# Patient Record
Sex: FEMALE | Race: WHITE | Employment: UNEMPLOYED | URBAN - METROPOLITAN AREA
[De-identification: names, ages, dates, MRNs, and addresses within clinical notes are randomized per-mention and may not be internally consistent; named-entity substitution may affect disease eponyms.]

---

## 2018-03-15 ENCOUNTER — OFFICE VISIT (OUTPATIENT)
Dept: ORTHOPEDIC SURGERY | Age: 53
End: 2018-03-15

## 2018-03-15 VITALS — WEIGHT: 293 LBS | BODY MASS INDEX: 41.02 KG/M2 | HEIGHT: 71 IN

## 2018-03-15 DIAGNOSIS — S93.492A SPRAIN OF ANTERIOR TALOFIBULAR LIGAMENT OF LEFT ANKLE, INITIAL ENCOUNTER: ICD-10-CM

## 2018-03-15 DIAGNOSIS — S52.552D OTHER CLOSED EXTRA-ARTICULAR FRACTURE OF DISTAL END OF LEFT RADIUS WITH ROUTINE HEALING, SUBSEQUENT ENCOUNTER: Primary | ICD-10-CM

## 2018-03-15 PROCEDURE — 99024 POSTOP FOLLOW-UP VISIT: CPT | Performed by: ORTHOPAEDIC SURGERY

## 2018-03-15 NOTE — PROGRESS NOTES
mood, appropriate behavior, follows commands    NEURO: Sensation is intact distally with light touch with no alteration. Motor exam of the lower extremities show quadriceps, hamstrings, foot dorsiflexion and plantarflexion grossly intact 5/5. LYMPH: No lymphedema present distally in upper or lower extrimity. MUSCULOSKELETAL:  Gait: Patient walks around the exam room and in the hallway with a normal gait and no limp. Balance and coordination are age appropriate. Shoulder Exam:  Full range of motion without pain. Elbow Exam:  Full painless range of motion. No effusion. No tenderness to palpation. Wrist/Hand Exam:  Moderate swelling in the left wrist. Tenderness to palpation over the distal radius. Decreased range of motion secondary to pain. Tender along the snuffbox region. Imaging:  Xr Elbow Left (2 Views)    Result Date: 3/8/2018  Patient MRN:  [id number] : 1965 Age: 47 years Gender: Female Order Date:  3/8/2018 2:30 PM EXAM: XR ELBOW LEFT (2 VIEWS) NUMBER OF IMAGES:  2 INDICATION:  Pain  COMPARISON: None FINDINGS:  No joint effusion. No acute fracture, dislocation or radiopaque foreign body. 1. No acute osseous abnormality. Xr Wrist Left (min 3 Views)    Result Date: 3/8/2018  Patient MRN:  [id number] : 1965 Age: 47 years Gender: Female Order Date:  3/8/2018 2:31 PM EXAM: XR WRIST LEFT (MIN 3 VIEWS) 3 views NUMBER OF IMAGES:  3 INDICATION:  pain  COMPARISON: None FINDINGS:  Slight narrowing of the radiocarpal joint. Slight ulnar minus variance. No acute fracture, dislocation or radiopaque foreign body. 1. No acute osseous abnormality. If symptoms persist, follow-up in 5-7 days to assess for any occult injury.      Xr Foot Left (min 3 Views)    Result Date: 3/8/2018  Patient MRN:  [id number] : 1965 Age: 47 years Gender: Female Order Date:  3/8/2018 2:30 PM EXAM: XR FOOT LEFT (MIN 3 VIEWS) NUMBER OF VIEWS:  3 INDICATION:  pain status post fall COMPARISON: None

## 2018-03-23 ENCOUNTER — OFFICE VISIT (OUTPATIENT)
Dept: ORTHOPEDIC SURGERY | Age: 53
End: 2018-03-23

## 2018-03-23 VITALS — BODY MASS INDEX: 41.02 KG/M2 | WEIGHT: 293 LBS | HEIGHT: 71 IN

## 2018-03-23 DIAGNOSIS — S52.552D OTHER CLOSED EXTRA-ARTICULAR FRACTURE OF DISTAL END OF LEFT RADIUS WITH ROUTINE HEALING, SUBSEQUENT ENCOUNTER: Primary | ICD-10-CM

## 2018-03-23 PROCEDURE — 99024 POSTOP FOLLOW-UP VISIT: CPT | Performed by: ORTHOPAEDIC SURGERY

## 2018-03-25 NOTE — PROGRESS NOTES
pulses UE and LE bilateral.     PSYCHIATRY: Pleasant mood, appropriate behavior, follows commands    NEURO: Sensation is intact distally with light touch with no alteration. Motor exam of the lower extremities show quadriceps, hamstrings, foot dorsiflexion and plantarflexion grossly intact 5/5. LYMPH: No lymphedema present distally in upper or lower extrimity. MUSCULOSKELETAL:  Gait: Patient walks around the exam room and in the hallway with a normal gait and no limp. Balance and coordination are age appropriate. Shoulder Exam:  Full range of motion without pain. Elbow Exam:  Full painless range of motion. No effusion. No tenderness to palpation. Wrist/Hand Exam:  Moderate swelling in the left wrist. Tenderness to palpation over the distal radius. Decreased range of motion secondary to pain. Tender along the snuffbox region. Imaging:  Xr Elbow Left (2 Views)    Result Date: 3/8/2018  Patient MRN:  [id number] : 1965 Age: 47 years Gender: Female Order Date:  3/8/2018 2:30 PM EXAM: XR ELBOW LEFT (2 VIEWS) NUMBER OF IMAGES:  2 INDICATION:  Pain  COMPARISON: None FINDINGS:  No joint effusion. No acute fracture, dislocation or radiopaque foreign body. 1. No acute osseous abnormality. Xr Wrist Left (min 3 Views)    Result Date: 3/8/2018  Patient MRN:  [id number] : 1965 Age: 47 years Gender: Female Order Date:  3/8/2018 2:31 PM EXAM: XR WRIST LEFT (MIN 3 VIEWS) 3 views NUMBER OF IMAGES:  3 INDICATION:  pain  COMPARISON: None FINDINGS:  Slight narrowing of the radiocarpal joint. Slight ulnar minus variance. No acute fracture, dislocation or radiopaque foreign body. 1. No acute osseous abnormality. If symptoms persist, follow-up in 5-7 days to assess for any occult injury.      Xr Foot Left (min 3 Views)    Result Date: 3/8/2018  Patient MRN:  [id number] : 1965 Age: 47 years Gender: Female Order Date:  3/8/2018 2:30 PM EXAM: XR FOOT LEFT (MIN 3 VIEWS) NUMBER OF VIEWS:  3

## 2018-04-12 DIAGNOSIS — S52.552D OTHER CLOSED EXTRA-ARTICULAR FRACTURE OF DISTAL END OF LEFT RADIUS WITH ROUTINE HEALING, SUBSEQUENT ENCOUNTER: ICD-10-CM

## 2018-04-13 ENCOUNTER — OFFICE VISIT (OUTPATIENT)
Dept: ORTHOPEDIC SURGERY | Age: 53
End: 2018-04-13

## 2018-04-13 VITALS — BODY MASS INDEX: 41.02 KG/M2 | WEIGHT: 293 LBS | TEMPERATURE: 98 F | HEIGHT: 71 IN

## 2018-04-13 DIAGNOSIS — S52.552D OTHER CLOSED EXTRA-ARTICULAR FRACTURE OF DISTAL END OF LEFT RADIUS WITH ROUTINE HEALING, SUBSEQUENT ENCOUNTER: Primary | ICD-10-CM

## 2018-04-13 PROCEDURE — 99024 POSTOP FOLLOW-UP VISIT: CPT | Performed by: ORTHOPAEDIC SURGERY

## 2018-05-24 DIAGNOSIS — M25.532 LEFT WRIST PAIN: Primary | ICD-10-CM

## 2018-05-25 ENCOUNTER — OFFICE VISIT (OUTPATIENT)
Dept: ORTHOPEDIC SURGERY | Age: 53
End: 2018-05-25

## 2018-05-25 VITALS — TEMPERATURE: 97.9 F | WEIGHT: 293 LBS | BODY MASS INDEX: 41.02 KG/M2 | HEIGHT: 71 IN

## 2018-05-25 DIAGNOSIS — S52.552D OTHER CLOSED EXTRA-ARTICULAR FRACTURE OF DISTAL END OF LEFT RADIUS WITH ROUTINE HEALING, SUBSEQUENT ENCOUNTER: Primary | ICD-10-CM

## 2018-05-25 PROCEDURE — 99024 POSTOP FOLLOW-UP VISIT: CPT | Performed by: ORTHOPAEDIC SURGERY

## 2018-05-25 RX ORDER — ATORVASTATIN CALCIUM 20 MG/1
TABLET, FILM COATED ORAL
Refills: 0 | COMMUNITY
Start: 2018-05-14

## 2018-05-25 RX ORDER — AMLODIPINE BESYLATE 5 MG/1
TABLET ORAL
Refills: 0 | COMMUNITY
Start: 2018-05-14

## 2018-07-28 ENCOUNTER — HOSPITAL ENCOUNTER (EMERGENCY)
Age: 53
Discharge: HOME | End: 2018-07-28
Payer: COMMERCIAL

## 2018-07-28 VITALS
OXYGEN SATURATION: 96 % | TEMPERATURE: 97.7 F | HEART RATE: 67 BPM | RESPIRATION RATE: 18 BRPM | WEIGHT: 293 LBS | HEIGHT: 71 IN | DIASTOLIC BLOOD PRESSURE: 83 MMHG | SYSTOLIC BLOOD PRESSURE: 131 MMHG | BODY MASS INDEX: 41.02 KG/M2

## 2018-07-28 DIAGNOSIS — N39.0 URINARY TRACT INFECTION WITHOUT HEMATURIA, SITE UNSPECIFIED: Primary | ICD-10-CM

## 2018-07-28 LAB
BACTERIA: ABNORMAL /HPF
BILIRUBIN URINE: NEGATIVE
BLOOD, URINE: ABNORMAL
CLARITY: CLEAR
COLOR: YELLOW
EPITHELIAL CELLS, UA: ABNORMAL /HPF
GLUCOSE URINE: NEGATIVE MG/DL
KETONES, URINE: NEGATIVE MG/DL
LEUKOCYTE ESTERASE, URINE: ABNORMAL
NITRITE, URINE: NEGATIVE
PH UA: 6 (ref 5–9)
PROTEIN UA: NEGATIVE MG/DL
RBC UA: ABNORMAL /HPF (ref 0–2)
SPECIFIC GRAVITY UA: <=1.005 (ref 1–1.03)
UROBILINOGEN, URINE: 0.2 E.U./DL
WBC UA: >20 /HPF (ref 0–5)

## 2018-07-28 PROCEDURE — 87088 URINE BACTERIA CULTURE: CPT

## 2018-07-28 PROCEDURE — 99212 OFFICE O/P EST SF 10 MIN: CPT

## 2018-07-28 PROCEDURE — 81001 URINALYSIS AUTO W/SCOPE: CPT

## 2018-07-28 RX ORDER — PHENAZOPYRIDINE HYDROCHLORIDE 100 MG/1
100 TABLET, FILM COATED ORAL 3 TIMES DAILY PRN
Qty: 6 TABLET | Refills: 0 | Status: SHIPPED | OUTPATIENT
Start: 2018-07-28 | End: 2018-07-30

## 2018-07-28 RX ORDER — SOLIFENACIN SUCCINATE 10 MG/1
10 TABLET, FILM COATED ORAL DAILY
COMMUNITY
End: 2020-06-24

## 2018-07-28 RX ORDER — CEPHALEXIN 500 MG/1
500 CAPSULE ORAL 3 TIMES DAILY
Qty: 21 CAPSULE | Refills: 0 | Status: SHIPPED | OUTPATIENT
Start: 2018-07-28 | End: 2018-08-04

## 2018-07-28 RX ORDER — NEBIVOLOL 10 MG/1
TABLET ORAL DAILY
COMMUNITY
End: 2020-06-24

## 2018-07-28 ASSESSMENT — PAIN DESCRIPTION - PAIN TYPE: TYPE: ACUTE PAIN

## 2018-07-28 ASSESSMENT — PAIN DESCRIPTION - LOCATION: LOCATION: ABDOMEN;BACK;PERINEUM

## 2018-07-28 ASSESSMENT — PAIN DESCRIPTION - ONSET: ONSET: GRADUAL

## 2018-07-28 ASSESSMENT — PAIN SCALES - GENERAL: PAINLEVEL_OUTOF10: 8

## 2018-07-28 ASSESSMENT — PAIN DESCRIPTION - PROGRESSION: CLINICAL_PROGRESSION: GRADUALLY WORSENING

## 2018-07-28 ASSESSMENT — PAIN DESCRIPTION - ORIENTATION: ORIENTATION: LOWER

## 2018-07-28 ASSESSMENT — PAIN DESCRIPTION - FREQUENCY: FREQUENCY: CONTINUOUS

## 2018-07-28 ASSESSMENT — PAIN DESCRIPTION - DESCRIPTORS: DESCRIPTORS: PRESSURE;BURNING

## 2018-07-28 NOTE — ED PROVIDER NOTES
Department of Emergency Medicine  ED Provider Note  Admit Date: 7/28/2018  Room: 02/02 7/28/18  8:45 AM      HISTORY OF PRESENT ILLNESS:  (Nurses Notes Reviewed)    Chief Complaint:   Abdominal Pain (Having lower abdominal and lower back pain and pressure); Dysuria (Burning with urination.); and Chills          Anali Contreras is a 47 y.o. old female presenting to the emergency department for concerns for urinary tract infection, which occurred 5 hours prior to arrival.  It woke her up around 3:00 this morning and she's been up ever since with symptoms. Since onset the symptoms have been constant and mild-moderate in severity. Symptoms are associated with dysuria, urinary frequency and urinary urgency and denies any abdominal pain, back pain or vaginal discharge. Review of Systems:   Pertinent positives and negatives are stated within HPI, all other systems reviewed and are negative.          --------------------------------------------- PAST HISTORY ---------------------------------------------  Past Medical History:  has a past medical history of Asymptomatic varicose veins; Breast self examination education, encounter for; Chronic cystitis; Colon polyps; Depression screening; Diabetes mellitus screening; Diuretic-induced hypokalemia; GERD (gastroesophageal reflux disease); Hematuria; Hx of mammogram; Hyperlipidemia; Hypertension; Immunizations up to date; Laboratory test; Migraines; Osteoarthritis; Pap smear for cervical cancer screening; Raynaud's phenomenon; Scleroderma ([facility] [address] Unspecified hemorrhoids without mention of complication; Unspecified vitamin D deficiency; Urinary tract infection, site not specified; and Varicose veins. Past Surgical History:  has a past surgical history that includes Cholecystectomy (12/19/1994 approx.) and Colonoscopy (01/03/11). Social History:  reports that she has never smoked.  She has never used smokeless tobacco. She reports that she does not drink

## 2018-07-30 LAB — URINE CULTURE, ROUTINE: NORMAL

## 2020-02-03 ENCOUNTER — HOSPITAL ENCOUNTER (OUTPATIENT)
Age: 55
Discharge: HOME | End: 2020-02-05
Payer: COMMERCIAL

## 2020-02-03 LAB
ALBUMIN SERPL-MCNC: 4.3 G/DL (ref 3.5–5.2)
ALP BLD-CCNC: 97 U/L (ref 35–104)
ALT SERPL-CCNC: 16 U/L (ref 0–32)
ANION GAP SERPL CALCULATED.3IONS-SCNC: 16 MMOL/L (ref 7–16)
AST SERPL-CCNC: 14 U/L (ref 0–31)
BILIRUB SERPL-MCNC: 0.5 MG/DL (ref 0–1.2)
BUN BLDV-MCNC: 16 MG/DL (ref 6–20)
CALCIUM SERPL-MCNC: 9.5 MG/DL (ref 8.6–10.2)
CHLORIDE BLD-SCNC: 102 MMOL/L (ref 98–107)
CHOLESTEROL, TOTAL: 154 MG/DL (ref 0–199)
CO2: 23 MMOL/L (ref 22–29)
CREAT SERPL-MCNC: 0.9 MG/DL (ref 0.5–1)
GFR AFRICAN AMERICAN: >60
GFR NON-AFRICAN AMERICAN: >60 ML/MIN/1.73
GLUCOSE BLD-MCNC: 91 MG/DL (ref 74–99)
HCT VFR BLD CALC: 48 % (ref 34–48)
HDLC SERPL-MCNC: 36 MG/DL
HEMOGLOBIN: 15 G/DL (ref 11.5–15.5)
LDL CHOLESTEROL CALCULATED: 88 MG/DL (ref 0–99)
MCH RBC QN AUTO: 28.3 PG (ref 26–35)
MCHC RBC AUTO-ENTMCNC: 31.3 % (ref 32–34.5)
MCV RBC AUTO: 90.6 FL (ref 80–99.9)
PDW BLD-RTO: 12.9 FL (ref 11.5–15)
PLATELET # BLD: 193 E9/L (ref 130–450)
PMV BLD AUTO: 12 FL (ref 7–12)
POTASSIUM SERPL-SCNC: 4.2 MMOL/L (ref 3.5–5)
RBC # BLD: 5.3 E12/L (ref 3.5–5.5)
SODIUM BLD-SCNC: 141 MMOL/L (ref 132–146)
TOTAL PROTEIN: 7.8 G/DL (ref 6.4–8.3)
TRIGL SERPL-MCNC: 151 MG/DL (ref 0–149)
VLDLC SERPL CALC-MCNC: 30 MG/DL
WBC # BLD: 6.2 E9/L (ref 4.5–11.5)

## 2020-02-03 PROCEDURE — 85027 COMPLETE CBC AUTOMATED: CPT

## 2020-02-03 PROCEDURE — 80053 COMPREHEN METABOLIC PANEL: CPT

## 2020-02-03 PROCEDURE — 80061 LIPID PANEL: CPT

## 2020-06-24 ENCOUNTER — OFFICE VISIT (OUTPATIENT)
Dept: ORTHOPEDIC SURGERY | Age: 55
End: 2020-06-24
Payer: COMMERCIAL

## 2020-06-24 VITALS — BODY MASS INDEX: 41.02 KG/M2 | HEIGHT: 71 IN | WEIGHT: 293 LBS

## 2020-06-24 PROCEDURE — 99213 OFFICE O/P EST LOW 20 MIN: CPT | Performed by: ORTHOPAEDIC SURGERY

## 2020-06-24 RX ORDER — LOSARTAN POTASSIUM 50 MG/1
TABLET ORAL
COMMUNITY
Start: 2020-05-21

## 2020-06-24 NOTE — PROGRESS NOTES
Chief Complaint   Patient presents with    Knee Pain     Right knee pain for about a month. Patient started a new walking regimen. about 2 weeks ago while going up steps she felt a pull in the posterior/hamsting region. It has improved to a degree. HPI:    Patient is 48 y.o. female complaining chronic, atraumatic, insidious onset right knee pain for 4 weeks. She admits to stiffness, deep, aching pain, swelling, difficulty with stairs and ambulating far distances. She admits to gross instability specifically in her right knee. Previous treatments include rest, walking, stretching, anti-inflammatory medications and ice without much relief. ROS:    Skin: (-) rash,(-) psoriasis,(-) eczema, (-)skin cancer. Neurologic: (-)numbness, (-)tingling, (-)headaches, (-) LOC. Cardiovascular: (-) Chest pain, (-) swelling in legs/feet, (-) SOB, (-) cramping in legs/feet with walking. All other review of systems negative except stated above or in HPI      Past Medical History:   Diagnosis Date    Asymptomatic varicose veins     Breast self examination education, encounter for     Chronic cystitis     found on cystoscopy    Colon polyps     Depression screening     Diabetes mellitus screening 02/12/14    HGB A1c    Diuretic-induced hypokalemia 1/26/2016    GERD (gastroesophageal reflux disease)     Hematuria     evaluated 2013 by Dr. Elvia Chatman. Cystoscopy revealed chronic cystitis.     Hx of mammogram     05/01/13    Hyperlipidemia 02/12/14    Hypertension     Immunizations up to date 09/21/14    INFLUENZA    Laboratory test 07/15/15    T3 FREE, TSH, T4 FREE    Migraines     Osteoarthritis     Pap smear for cervical cancer screening     Raynaud's phenomenon     Scleroderma ([facility] [address]     Unspecified hemorrhoids without mention of complication     Unspecified vitamin D deficiency     Urinary tract infection, site not specified     Varicose veins      Past Surgical History:   Procedure Laterality Not on file     Family History   Problem Relation Age of Onset    Anxiety Disorder Other         Nervous breakdown    Hypertension Other     Cancer Other         Breast    Cancer Mother         Lung    Osteoarthritis Mother     Alcohol Abuse Mother     Anxiety Disorder Father         Nervous breakdown    Cancer Father         Colon    Diabetes Maternal Aunt     Cancer Maternal Grandmother         Uterus    Coronary Art Dis Paternal Grandmother     Coronary Art Dis Paternal Grandfather            Physical Exam:    Ht 5' 11\" (1.803 m)   Wt (!) 337 lb (152.9 kg)   LMP 05/08/2015 (Approximate)   BMI 47.00 kg/m²     GENERAL: alert, appears stated age, cooperative, no acute distress    HEENT: Head is normocephalic, atraumatic. PERRLA. SKIN: Clean, dry, intact. There is not any cellulitis or cutaneous lesions noted in the lower extremities except noted in MSK    PULMONARY: breathing is regular and unlabored, no acute distress    CV: The bilateral upper and lower extremities are warm and well-perfused with brisk capillary refill. 2+ pulses UE and LE bilateral.     PSYCHIATRY: Pleasant mood, appropriate behavior, follows commands    NEURO: Sensation is intact distally with light touch with no alteration. Motor exam of the lower extremities show quadriceps, hamstrings, foot dorsiflexion and plantarflexion grossly intact 5/5. LYMPH: No lymphedema present distally in upper or lower extremity. MUSCULOSKELETAL:    Right Knee Exam:    mild effusion noted. No erythema/induration/fluctuance. Posterior medial joint line TTP. Stable to varus and valgus at 0 and 30 degrees of flexion. Negative Lachman's and posterior drawer. Negative patellar grind test and J sign. Compartments soft and compressible throughout leg. Active range of motion 0-120 with pain.   Positive Ish's positive Apley's, gait is antalgic        Imaging:  Xr Knee Right (min 4 Views)    Result Date: 6/24/2020  EXAMINATION: FOUR XRAY

## 2020-07-08 ENCOUNTER — OFFICE VISIT (OUTPATIENT)
Dept: ORTHOPEDIC SURGERY | Age: 55
End: 2020-07-08
Payer: COMMERCIAL

## 2020-07-08 VITALS — BODY MASS INDEX: 41.02 KG/M2 | HEIGHT: 71 IN | WEIGHT: 293 LBS

## 2020-07-08 PROCEDURE — 99214 OFFICE O/P EST MOD 30 MIN: CPT | Performed by: ORTHOPAEDIC SURGERY

## 2020-07-08 NOTE — PROGRESS NOTES
Chief Complaint   Patient presents with    Knee Pain     Right knee MRI follow up. HPI:    Patient is 48 y.o. female complaining chronic, atraumatic, insidious onset right knee pain for 4 weeks. She admits to stiffness, deep, aching pain, swelling, difficulty with stairs and ambulating far distances. She admits to gross instability specifically in her right knee. Previous treatments include rest, walking, stretching, anti-inflammatory medications and ice without much relief. Follows up after MRI. ROS:    Skin: (-) rash,(-) psoriasis,(-) eczema, (-)skin cancer. Neurologic: (-)numbness, (-)tingling, (-)headaches, (-) LOC. Cardiovascular: (-) Chest pain, (-) swelling in legs/feet, (-) SOB, (-) cramping in legs/feet with walking. All other review of systems negative except stated above or in HPI      Past Medical History:   Diagnosis Date    Asymptomatic varicose veins     Breast self examination education, encounter for     Chronic cystitis     found on cystoscopy    Colon polyps     Depression screening     Diabetes mellitus screening 02/12/14    HGB A1c    Diuretic-induced hypokalemia 1/26/2016    GERD (gastroesophageal reflux disease)     Hematuria     evaluated 2013 by Dr. Benítez. Cystoscopy revealed chronic cystitis.  Hx of mammogram     05/01/13    Hyperlipidemia 02/12/14    Hypertension     Immunizations up to date 09/21/14    INFLUENZA    Laboratory test 07/15/15    T3 FREE, TSH, T4 FREE    Migraines     Osteoarthritis     Pap smear for cervical cancer screening     Raynaud's phenomenon     Scleroderma ([facility] [address]     Unspecified hemorrhoids without mention of complication     Unspecified vitamin D deficiency     Urinary tract infection, site not specified     Varicose veins      Past Surgical History:   Procedure Laterality Date    CHOLECYSTECTOMY  12/19/1994 approx.     COLONOSCOPY  01/03/11    Dr. Aron Taveras       Current Outpatient Medications:     losartan (COZAAR) 50 MG tablet, take 1 tablet by mouth once daily, Disp: , Rfl:     amLODIPine (NORVASC) 5 MG tablet, take 1 tablet by mouth once daily as directed, Disp: , Rfl: 0    atorvastatin (LIPITOR) 20 MG tablet, take 1 tablet by mouth once daily, Disp: , Rfl: 0    aspirin 81 MG tablet, Take 81 mg by mouth daily, Disp: , Rfl:     Cholecalciferol (VITAMIN D3) 2000 UNITS CAPS, Take 2,000 Units by mouth daily, Disp: , Rfl:   Allergies   Allergen Reactions    Lisinopril      Social History     Socioeconomic History    Marital status:      Spouse name: Not on file    Number of children: Not on file    Years of education: Not on file    Highest education level: Not on file   Occupational History    Not on file   Social Needs    Financial resource strain: Not on file    Food insecurity     Worry: Not on file     Inability: Not on file    Transportation needs     Medical: Not on file     Non-medical: Not on file   Tobacco Use    Smoking status: Never Smoker    Smokeless tobacco: Never Used   Substance and Sexual Activity    Alcohol use: No    Drug use: No    Sexual activity: Yes     Partners: Male   Lifestyle    Physical activity     Days per week: Not on file     Minutes per session: Not on file    Stress: Not on file   Relationships    Social connections     Talks on phone: Not on file     Gets together: Not on file     Attends Denominational service: Not on file     Active member of club or organization: Not on file     Attends meetings of clubs or organizations: Not on file     Relationship status: Not on file    Intimate partner violence     Fear of current or ex partner: Not on file     Emotionally abused: Not on file     Physically abused: Not on file     Forced sexual activity: Not on file   Other Topics Concern    Not on file   Social History Narrative    Not on file     Family History   Problem Relation Age of Onset    Anxiety Disorder Other         Nervous breakdown    Hypertension Other     Cancer Other         Breast    Cancer Mother         Lung    Osteoarthritis Mother     Alcohol Abuse Mother     Anxiety Disorder Father         Nervous breakdown    Cancer Father         Colon    Diabetes Maternal Aunt     Cancer Maternal Grandmother         Uterus    Coronary Art Dis Paternal Grandmother     Coronary Art Dis Paternal Grandfather            Physical Exam:    Ht 5' 11\" (1.803 m)   Wt (!) 337 lb (152.9 kg)   LMP 05/08/2015 (Approximate)   BMI 47.00 kg/m²     GENERAL: alert, appears stated age, cooperative, no acute distress    HEENT: Head is normocephalic, atraumatic. PERRLA. SKIN: Clean, dry, intact. There is not any cellulitis or cutaneous lesions noted in the lower extremities except noted in MSK    PULMONARY: breathing is regular and unlabored, no acute distress    CV: The bilateral upper and lower extremities are warm and well-perfused with brisk capillary refill. 2+ pulses UE and LE bilateral.     PSYCHIATRY: Pleasant mood, appropriate behavior, follows commands    NEURO: Sensation is intact distally with light touch with no alteration. Motor exam of the lower extremities show quadriceps, hamstrings, foot dorsiflexion and plantarflexion grossly intact 5/5. LYMPH: No lymphedema present distally in upper or lower extremity. MUSCULOSKELETAL:    Right Knee Exam:    mild effusion noted. No erythema/induration/fluctuance. Posterior medial joint line TTP. Stable to varus and valgus at 0 and 30 degrees of flexion. Negative Lachman's and posterior drawer. Negative patellar grind test and J sign. Compartments soft and compressible throughout leg. Active range of motion 0-120 with pain. Positive Ish's positive Apley's, gait is antalgic        Imaging:  Xr Knee Right (min 4 Views)    Result Date: 6/24/2020  EXAMINATION: FOUR XRAY VIEWS OF THE RIGHT KNEE 6/24/2020 8:23 am COMPARISON: None.  HISTORY: ORDERING SYSTEM PROVIDED HISTORY: Right knee pain, unspecified chronicity FINDINGS: Right knee: There is moderate narrowing of the medial joint space. There is no evidence for osteophyte formation. The lateral joint space appears unremarkable. There is mild narrowing of the patellofemoral joint space. There is no fracture, effusion or dislocation. There is no joint effusion. Limited evaluation of the left knee demonstrates some minimal medial joint space narrowing. Moderate narrowing of the medial joint space and patellofemoral joint space with no evidence for osteophyte formation. Mri Knee Right Wo Contrast    Result Date: 7/6/2020  EXAMINATION: MRI OF THE RIGHT KNEE WITHOUT CONTRAST, 7/2/2020 6:07 am TECHNIQUE: Multiplanar multisequence MRI of the right knee was performed without the administration of intravenous contrast. COMPARISON: None. HISTORY: ORDERING SYSTEM PROVIDED HISTORY: Tear of meniscus of right knee, unspecified meniscus, unspecified tear type, unspecified whether old or current tear FINDINGS: MENISCI: There is a subtle nondisplaced oblique tear of the posterior horn and body of the medial meniscus extending to the undersurface of the meniscus. No parameniscal cyst.  The lateral meniscus is intact. CRUCIATE LIGAMENTS: The anterior and posterior cruciate ligaments are normal in signal, morphology and course. EXTENSOR MECHANISM: The quadriceps and patellar tendons are intact. The medial and lateral patellar retinacula are intact. LATERAL COLLATERAL LIGAMENT COMPLEX: The popliteus tendon, biceps femoris tendon, fibular collateral ligament and iliotibial band are intact. MEDIAL COLLATERAL LIGAMENT COMPLEX: The superficial and deep components of the medial collateral ligament are intact. KNEE JOINT: No significant joint effusion. No Baker's cyst.  Grade 2/3 chondromalacia changes noted along the patellar apex and lateral patellar facet with articular cartilage thinning and small subchondral cysts noted along the lateral patellar facet.   There is mild lateral tilt of the patella. Mild thinning of the articular cartilage in the medial compartment of the knee with small marginal osteophytes. No focal articular cartilage defect. No evidence of osteochondral abnormality. BONE MARROW: No acute fracture or marrow occupying lesion. No acute marrow contusion. Nondisplaced oblique tear of the posterior horn and body of the medial meniscus with features suggesting a degenerative type tear. No evidence of parameniscal cyst.  Mild associated medial compartmental articular cartilage degenerative changes without focal articular cartilage defect. Grade 2/3 chondromalacia changes of the patellar apex and lateral patellar facet with mild lateral patellar tilt. Dickson was seen today for knee pain. Diagnoses and all orders for this visit:    Tear of meniscus of right knee, unspecified meniscus, unspecified tear type, unspecified whether old or current tear    Primary osteoarthritis of right knee    BMI 45.0-49.9, [facility])        Patient seen and examined. X-rays reviewed. Patient has little to no arthritis noted on x-rays today. However patient's main complaint is instability of symptomatic knee. Exam and history is consistent with possible meniscus tear. MRI recommended for further evaluation management. Patient seen and examined. MRI reviewed with patient in detail. Natural history and course discussed with patient in long discussion  Treatment options discussed with patient in detail including risks and benefits. Patient should do well with conservative management as patient would like to avoid surgery at this time. In a 15 minute assessment and discussion, patient was counseled on continued weight loss, diet, and physical activity relating to this condition.  She was educated with options in detail including nutrition, joining a health club/ weight loss program, and use of cardio equipment such as the Arc Trainer and the importance of use as well

## 2020-09-16 ENCOUNTER — OFFICE VISIT (OUTPATIENT)
Dept: ORTHOPEDIC SURGERY | Age: 55
End: 2020-09-16
Payer: COMMERCIAL

## 2020-09-16 VITALS — BODY MASS INDEX: 41.02 KG/M2 | WEIGHT: 293 LBS | HEIGHT: 71 IN

## 2020-09-16 PROCEDURE — 99214 OFFICE O/P EST MOD 30 MIN: CPT | Performed by: ORTHOPAEDIC SURGERY

## 2020-09-16 NOTE — PROGRESS NOTES
Chief Complaint   Patient presents with    Knee Pain     c/o right knee pain off and on         HPI:    Patient is 48 y.o. female complaining chronic, atraumatic, insidious onset right knee pain for 4 weeks. She admits to stiffness, deep, aching pain, swelling, difficulty with stairs and ambulating far distances. She admits to gross instability specifically in her right knee. Previous treatments include rest, walking, stretching, anti-inflammatory medications and ice without much relief. Follows up after MRI and recheck. ROS:    Skin: (-) rash,(-) psoriasis,(-) eczema, (-)skin cancer. Neurologic: (-)numbness, (-)tingling, (-)headaches, (-) LOC. Cardiovascular: (-) Chest pain, (-) swelling in legs/feet, (-) SOB, (-) cramping in legs/feet with walking. All other review of systems negative except stated above or in HPI      Past Medical History:   Diagnosis Date    Asymptomatic varicose veins     Breast self examination education, encounter for     Chronic cystitis     found on cystoscopy    Colon polyps     Depression screening     Diabetes mellitus screening 02/12/14    HGB A1c    Diuretic-induced hypokalemia 1/26/2016    GERD (gastroesophageal reflux disease)     Hematuria     evaluated 2013 by Dr. Benítez. Cystoscopy revealed chronic cystitis.  Hx of mammogram     05/01/13    Hyperlipidemia 02/12/14    Hypertension     Immunizations up to date 09/21/14    INFLUENZA    Laboratory test 07/15/15    T3 FREE, TSH, T4 FREE    Migraines     Osteoarthritis     Pap smear for cervical cancer screening     Raynaud's phenomenon     Scleroderma ([facility] [address]     Unspecified hemorrhoids without mention of complication     Unspecified vitamin D deficiency     Urinary tract infection, site not specified     Varicose veins      Past Surgical History:   Procedure Laterality Date    CHOLECYSTECTOMY  12/19/1994 approx.     COLONOSCOPY  01/03/11    Dr. Aron Taveras       Current Outpatient Medications:    Hypertension Other     Cancer Other         Breast    Cancer Mother         Lung    Osteoarthritis Mother     Alcohol Abuse Mother     Anxiety Disorder Father         Nervous breakdown    Cancer Father         Colon    Diabetes Maternal Aunt     Cancer Maternal Grandmother         Uterus    Coronary Art Dis Paternal Grandmother     Coronary Art Dis Paternal Grandfather            Physical Exam:    Ht 5' 11\" (1.803 m)   Wt (!) 322 lb (146.1 kg)   LMP 05/08/2015 (Approximate)   BMI 44.91 kg/m²     GENERAL: alert, appears stated age, cooperative, no acute distress    HEENT: Head is normocephalic, atraumatic. PERRLA. SKIN: Clean, dry, intact. There is not any cellulitis or cutaneous lesions noted in the lower extremities except noted in MSK    PULMONARY: breathing is regular and unlabored, no acute distress    CV: The bilateral upper and lower extremities are warm and well-perfused with brisk capillary refill. 2+ pulses UE and LE bilateral.     PSYCHIATRY: Pleasant mood, appropriate behavior, follows commands    NEURO: Sensation is intact distally with light touch with no alteration. Motor exam of the lower extremities show quadriceps, hamstrings, foot dorsiflexion and plantarflexion grossly intact 5/5. LYMPH: No lymphedema present distally in upper or lower extremity. MUSCULOSKELETAL:    Right Knee Exam:    mild effusion noted. No erythema/induration/fluctuance. Posterior medial joint line TTP. Stable to varus and valgus at 0 and 30 degrees of flexion. Negative Lachman's and posterior drawer. Negative patellar grind test and J sign. Compartments soft and compressible throughout leg. Active range of motion 0-120 with pain. Positive Ish's positive Apley's, gait is antalgic        Imaging:  Xr Knee Right (min 4 Views)    Result Date: 6/24/2020  EXAMINATION: FOUR XRAY VIEWS OF THE RIGHT KNEE 6/24/2020 8:23 am COMPARISON: None.  HISTORY: ORDERING SYSTEM PROVIDED HISTORY: Right knee pain, unspecified chronicity FINDINGS: Right knee: There is moderate narrowing of the medial joint space. There is no evidence for osteophyte formation. The lateral joint space appears unremarkable. There is mild narrowing of the patellofemoral joint space. There is no fracture, effusion or dislocation. There is no joint effusion. Limited evaluation of the left knee demonstrates some minimal medial joint space narrowing. Moderate narrowing of the medial joint space and patellofemoral joint space with no evidence for osteophyte formation. Mri Knee Right Wo Contrast    Result Date: 7/6/2020  EXAMINATION: MRI OF THE RIGHT KNEE WITHOUT CONTRAST, 7/2/2020 6:07 am TECHNIQUE: Multiplanar multisequence MRI of the right knee was performed without the administration of intravenous contrast. COMPARISON: None. HISTORY: ORDERING SYSTEM PROVIDED HISTORY: Tear of meniscus of right knee, unspecified meniscus, unspecified tear type, unspecified whether old or current tear FINDINGS: MENISCI: There is a subtle nondisplaced oblique tear of the posterior horn and body of the medial meniscus extending to the undersurface of the meniscus. No parameniscal cyst.  The lateral meniscus is intact. CRUCIATE LIGAMENTS: The anterior and posterior cruciate ligaments are normal in signal, morphology and course. EXTENSOR MECHANISM: The quadriceps and patellar tendons are intact. The medial and lateral patellar retinacula are intact. LATERAL COLLATERAL LIGAMENT COMPLEX: The popliteus tendon, biceps femoris tendon, fibular collateral ligament and iliotibial band are intact. MEDIAL COLLATERAL LIGAMENT COMPLEX: The superficial and deep components of the medial collateral ligament are intact. KNEE JOINT: No significant joint effusion. No Baker's cyst.  Grade 2/3 chondromalacia changes noted along the patellar apex and lateral patellar facet with articular cartilage thinning and small subchondral cysts noted along the lateral patellar facet. There is mild lateral tilt of the patella. Mild thinning of the articular cartilage in the medial compartment of the knee with small marginal osteophytes. No focal articular cartilage defect. No evidence of osteochondral abnormality. BONE MARROW: No acute fracture or marrow occupying lesion. No acute marrow contusion. Nondisplaced oblique tear of the posterior horn and body of the medial meniscus with features suggesting a degenerative type tear. No evidence of parameniscal cyst.  Mild associated medial compartmental articular cartilage degenerative changes without focal articular cartilage defect. Grade 2/3 chondromalacia changes of the patellar apex and lateral patellar facet with mild lateral patellar tilt. Yuridia Reason was seen today for knee pain. Diagnoses and all orders for this visit:    Tear of meniscus of right knee, unspecified meniscus, unspecified tear type, unspecified whether old or current tear    Primary osteoarthritis of right knee    BMI 45.0-49.9, [facility])            Patient seen and examined. X-rays reviewed. Patient has little to no arthritis noted on x-rays today. However patient's main complaint is instability of symptomatic knee. Exam and history is consistent with possible meniscus tear. Patient seen and examined. MRI reviewed with patient in detail. Natural history and course discussed with patient in long discussion  Treatment options discussed with patient in detail including risks and benefits. Patient should do well with conservative management as patient would like to avoid surgery at this time. In a 15 minute assessment and discussion, patient was counseled on continued weight loss, diet, and physical activity relating to this condition.  She was educated with options in detail including nutrition, joining a health club/ weight loss program, and use of cardio equipment such as the Arc [employer]er and the importance of use as well as range of motion and HEP exercises for weight loss. Dorman Goodness, DO          25 minutes was spent with patient. 50% or greater was spent counseling the patient.

## 2020-10-13 ENCOUNTER — HOSPITAL ENCOUNTER (OUTPATIENT)
Age: 55
Discharge: HOME | End: 2020-10-15
Payer: COMMERCIAL

## 2020-10-13 LAB
ALBUMIN SERPL-MCNC: 4.3 G/DL (ref 3.5–5.2)
ALP BLD-CCNC: 76 U/L (ref 35–104)
ALT SERPL-CCNC: 20 U/L (ref 0–32)
ANION GAP SERPL CALCULATED.3IONS-SCNC: 14 MMOL/L (ref 7–16)
AST SERPL-CCNC: 17 U/L (ref 0–31)
BILIRUB SERPL-MCNC: 0.4 MG/DL (ref 0–1.2)
BUN BLDV-MCNC: 16 MG/DL (ref 6–20)
CALCIUM SERPL-MCNC: 9.3 MG/DL (ref 8.6–10.2)
CHLORIDE BLD-SCNC: 100 MMOL/L (ref 98–107)
CHOLESTEROL, TOTAL: 154 MG/DL (ref 0–199)
CO2: 23 MMOL/L (ref 22–29)
CREAT SERPL-MCNC: 1.3 MG/DL (ref 0.5–1)
GFR AFRICAN AMERICAN: 52
GFR NON-AFRICAN AMERICAN: 43 ML/MIN/1.73
GLUCOSE BLD-MCNC: 96 MG/DL (ref 74–99)
HCT VFR BLD CALC: 46.9 % (ref 34–48)
HDLC SERPL-MCNC: 35 MG/DL
HEMOGLOBIN: 15.2 G/DL (ref 11.5–15.5)
LDL CHOLESTEROL CALCULATED: 88 MG/DL (ref 0–99)
MCH RBC QN AUTO: 29.3 PG (ref 26–35)
MCHC RBC AUTO-ENTMCNC: 32.4 % (ref 32–34.5)
MCV RBC AUTO: 90.4 FL (ref 80–99.9)
PDW BLD-RTO: 13.9 FL (ref 11.5–15)
PLATELET # BLD: 225 E9/L (ref 130–450)
PMV BLD AUTO: 12.5 FL (ref 7–12)
POTASSIUM SERPL-SCNC: 4.2 MMOL/L (ref 3.5–5)
RBC # BLD: 5.19 E12/L (ref 3.5–5.5)
SODIUM BLD-SCNC: 137 MMOL/L (ref 132–146)
TOTAL PROTEIN: 7.3 G/DL (ref 6.4–8.3)
TRIGL SERPL-MCNC: 155 MG/DL (ref 0–149)
VLDLC SERPL CALC-MCNC: 31 MG/DL
WBC # BLD: 5.1 E9/L (ref 4.5–11.5)

## 2020-10-13 PROCEDURE — 85027 COMPLETE CBC AUTOMATED: CPT

## 2020-10-13 PROCEDURE — 80053 COMPREHEN METABOLIC PANEL: CPT

## 2020-10-13 PROCEDURE — 80061 LIPID PANEL: CPT

## 2021-06-24 ENCOUNTER — HOSPITAL ENCOUNTER (OUTPATIENT)
Dept: PULMONOLOGY | Age: 56
Discharge: HOME | End: 2021-06-24
Payer: COMMERCIAL

## 2021-06-24 DIAGNOSIS — M34.1 CREST SYNDROME (HCC): ICD-10-CM

## 2021-06-24 DIAGNOSIS — R30.0 DYSURIA: ICD-10-CM

## 2021-06-24 DIAGNOSIS — M34.9 SYSTEMIC SCLEROSIS (HCC): ICD-10-CM

## 2021-06-24 PROCEDURE — 94726 PLETHYSMOGRAPHY LUNG VOLUMES: CPT

## 2021-06-24 PROCEDURE — 94729 DIFFUSING CAPACITY: CPT

## 2021-06-24 PROCEDURE — 94060 EVALUATION OF WHEEZING: CPT

## 2021-06-28 NOTE — PROCEDURES
[id number] [address]                               PULMONARY FUNCTION    PATIENT NAME: Walter Delaney                     :        1965  MED REC NO:   [id number]                            ROOM:  ACCOUNT NO:   [de-identified]                           ADMIT DATE: 2021  PROVIDER:     Star Fountain DO    DATE OF PROCEDURE:  2021    Pulmonary function test completed with good effort. Albuterol was given  for aerosol bronchodilator. SPIROMETRY:  Expiratory flow rates are within normal limits. FVC is  4.58 liters, which is 109% of predicted. Postbronchodilator is 4.36  liters, which is 104% of predicted. FEV1 is 3.56 liters, which is 108%  of predicted. Postbronchodilator is 3.63 liters, which is 110% of  predicted. ZJH74-85% is 108%. FEV1/FVC ratio is 78%. MVV is 129  liters per minute, which is 117% of predicted. Normal spirometry. Flow-volume loop appears normal without any intrathoracic or  extrathoracic obstruction. LUNG VOLUMES:  Total lung capacity is mildly increased indicating  hyperinflation. Total lung capacity is 8.44 liters, which is 138% of  predicted. RV/TLC is borderline increased at 45%. DIFFUSION:  DLCO uncorrected is 42.03 mL per minute per mmHg, which is  mildly increased at 124% of predicted. PFT INTERPRETATION:  1. Normal spirometry. 2.  Mild increased lung volumes indicating hyperinflation. 3.  Normal flow-volume loop. 4.  Normal diffusion capacity. CONCLUSION:  Normal pulmonary function test with mildly elevated lung  volumes indicating hyperinflation, otherwise normal.  Clinical  correlation is advised.         Mary Lacy DO    D: 2021 17:52:33       T: 2021 17:54:54     [email or web address]  Job#: [id number]     Doc#: [id number]    CC:

## 2022-09-06 ENCOUNTER — HOSPITAL ENCOUNTER (OUTPATIENT)
Age: 57
Setting detail: SPECIMEN
Discharge: HOME | End: 2022-09-06
Payer: COMMERCIAL

## 2022-09-06 PROCEDURE — 87329 GIARDIA AG IA: CPT

## 2022-09-06 PROCEDURE — 87328 CRYPTOSPORIDIUM AG IA: CPT

## 2022-09-07 LAB
CRYPTOSPORIDIUM ANTIGEN STOOL: NORMAL
GIARDIA ANTIGEN STOOL: NORMAL
REASON FOR REJECTION: NORMAL
REJECTED TEST: NORMAL

## 2023-10-16 ENCOUNTER — OFFICE VISIT (OUTPATIENT)
Dept: FAMILY MEDICINE CLINIC | Age: 58
End: 2023-10-16
Payer: COMMERCIAL

## 2023-10-16 VITALS
HEIGHT: 71 IN | WEIGHT: 293 LBS | HEART RATE: 77 BPM | DIASTOLIC BLOOD PRESSURE: 82 MMHG | TEMPERATURE: 97.2 F | SYSTOLIC BLOOD PRESSURE: 128 MMHG | OXYGEN SATURATION: 99 % | BODY MASS INDEX: 41.02 KG/M2 | RESPIRATION RATE: 16 BRPM

## 2023-10-16 DIAGNOSIS — L30.9 DERMATITIS: Primary | ICD-10-CM

## 2023-10-16 PROCEDURE — 3079F DIAST BP 80-89 MM HG: CPT | Performed by: NURSE PRACTITIONER

## 2023-10-16 PROCEDURE — 3074F SYST BP LT 130 MM HG: CPT | Performed by: NURSE PRACTITIONER

## 2023-10-16 PROCEDURE — 99213 OFFICE O/P EST LOW 20 MIN: CPT | Performed by: NURSE PRACTITIONER

## 2023-10-16 RX ORDER — PREDNISONE 10 MG/1
TABLET ORAL
Qty: 18 TABLET | Refills: 0 | Status: SHIPPED | OUTPATIENT
Start: 2023-10-16 | End: 2023-10-24

## 2023-10-16 SDOH — ECONOMIC STABILITY: HOUSING INSECURITY
IN THE LAST 12 MONTHS, WAS THERE A TIME WHEN YOU DID NOT HAVE A STEADY PLACE TO SLEEP OR SLEPT IN A SHELTER (INCLUDING NOW)?: NO

## 2023-10-16 SDOH — ECONOMIC STABILITY: INCOME INSECURITY: HOW HARD IS IT FOR YOU TO PAY FOR THE VERY BASICS LIKE FOOD, HOUSING, MEDICAL CARE, AND HEATING?: NOT HARD AT ALL

## 2023-10-16 SDOH — ECONOMIC STABILITY: FOOD INSECURITY: WITHIN THE PAST 12 MONTHS, THE FOOD YOU BOUGHT JUST DIDN'T LAST AND YOU DIDN'T HAVE MONEY TO GET MORE.: NEVER TRUE

## 2023-10-16 SDOH — ECONOMIC STABILITY: FOOD INSECURITY: WITHIN THE PAST 12 MONTHS, YOU WORRIED THAT YOUR FOOD WOULD RUN OUT BEFORE YOU GOT MONEY TO BUY MORE.: NEVER TRUE

## 2023-10-16 NOTE — PROGRESS NOTES
10/16/23  Chiquita Estrada : 1965 Sex: female  Age 63 y.o. Subjective:  Chief Complaint   Patient presents with    Rash     Saw  On 10/6/23 and was given Medrol dose pk and cream and injection of steroid and now rash has returned and pt has been dx with scleroderma in past. Pt states itches, and is located bilateral arms and bilateral legs, rash is coming back exactly where she had it before       HPI:   Chiquita Estrada , 63 y.o. female presents to the clinic for evaluation of rash to upper / lower extremities x 2 weeks days. The patient reports associated pruritus. The patient reports symptoms developed after doing yard work and using new dryer sheets. The patient report seeing PCP for symptoms. The patient has taken Medrol Dose Pack / Cream for symptoms. The patient reports resolving with returning symptoms symptoms over the past 2 days. Denies known cause for the rash including any new soaps, detergents, lotions, foods, or medications. Denies any recent illness, bleeding, drainage, lymphangitic streaking, arthralgia, myalgia,or lethargy. The patient also denies headache, fever, chest pain, abdominal pain, shortness of breath, and nausea / vomiting / diarrhea. ROS:   Unless otherwise stated in this report the patient's positive and negative responses for review of systems for constitutional, eyes, ENT, cardiovascular, respiratory, gastrointestinal, neurological, , musculoskeletal, and integument systems and related systems to the presenting problem are either stated in the history of present illness or were not pertinent or were negative for the symptoms and/or complaints related to the presenting medical problem. Positives and pertinent negatives as per HPI. All others reviewed and are negative.       PMH:     Past Medical History:   Diagnosis Date    Asymptomatic varicose veins     Breast self examination education, encounter for     Chronic cystitis     found on cystoscopy    Colon polyps

## 2023-12-28 ENCOUNTER — OFFICE VISIT (OUTPATIENT)
Dept: FAMILY MEDICINE CLINIC | Age: 58
End: 2023-12-28
Payer: COMMERCIAL

## 2023-12-28 VITALS
HEART RATE: 98 BPM | DIASTOLIC BLOOD PRESSURE: 70 MMHG | TEMPERATURE: 97 F | HEIGHT: 71 IN | WEIGHT: 293 LBS | OXYGEN SATURATION: 97 % | BODY MASS INDEX: 41.02 KG/M2 | SYSTOLIC BLOOD PRESSURE: 110 MMHG

## 2023-12-28 DIAGNOSIS — J32.9 SINOBRONCHITIS: Primary | ICD-10-CM

## 2023-12-28 DIAGNOSIS — U09.9 POST COVID-19 CONDITION, UNSPECIFIED: ICD-10-CM

## 2023-12-28 DIAGNOSIS — J40 SINOBRONCHITIS: Primary | ICD-10-CM

## 2023-12-28 DIAGNOSIS — R05.9 COUGH, UNSPECIFIED TYPE: ICD-10-CM

## 2023-12-28 PROCEDURE — 3074F SYST BP LT 130 MM HG: CPT

## 2023-12-28 PROCEDURE — 99213 OFFICE O/P EST LOW 20 MIN: CPT

## 2023-12-28 PROCEDURE — 3078F DIAST BP <80 MM HG: CPT

## 2023-12-28 RX ORDER — BENZONATATE 100 MG/1
100 CAPSULE ORAL 3 TIMES DAILY PRN
Qty: 21 CAPSULE | Refills: 0 | Status: SHIPPED | OUTPATIENT
Start: 2023-12-28 | End: 2024-01-04

## 2023-12-28 RX ORDER — METHYLPREDNISOLONE 4 MG/1
TABLET ORAL
COMMUNITY
Start: 2023-10-06 | End: 2023-12-28 | Stop reason: ALTCHOICE

## 2023-12-28 RX ORDER — METHYLPREDNISOLONE 4 MG/1
TABLET ORAL
Qty: 1 KIT | Refills: 0 | Status: SHIPPED | OUTPATIENT
Start: 2023-12-28

## 2023-12-28 RX ORDER — CEFDINIR 300 MG/1
300 CAPSULE ORAL 2 TIMES DAILY
Qty: 20 CAPSULE | Refills: 0 | Status: SHIPPED | OUTPATIENT
Start: 2023-12-28 | End: 2024-01-07

## 2023-12-28 RX ORDER — TRIAMCINOLONE ACETONIDE 1 MG/G
CREAM TOPICAL
COMMUNITY
Start: 2023-10-06

## 2023-12-28 RX ORDER — LOSARTAN POTASSIUM AND HYDROCHLOROTHIAZIDE 12.5; 5 MG/1; MG/1
1 TABLET ORAL DAILY
COMMUNITY
Start: 2023-10-14

## 2023-12-28 NOTE — PROGRESS NOTES
with mild post nasal drip present. No exudate or tonsillar hypertrophy noted. Neck:  Supple. There is no anterior cervical adenopathy. Lungs: CTAB without wheezes, rales, or rhonchi  Heart:  Regular rate and rhythm, normal heart sounds, without pathological murmurs, ectopy, gallops, or rubs. Skin:  Normal turgor. Warm, dry, without visible rash. Neurological:  Alert and oriented. Motor functions intact. Responds to verbal commands. Lab / Imaging Results   (All laboratory and radiology results have been personally reviewed by myself)  Labs:  No results found for this visit on 12/28/23. Imaging: All Radiology results interpreted by Radiologist unless otherwise noted. Assessment / Plan     Impression(s): Ramonita Germain was seen today for head congestion and cough. Diagnoses and all orders for this visit:    Sinobronchitis  -     cefdinir (OMNICEF) 300 MG capsule; Take 1 capsule by mouth 2 times daily for 10 days  -     methylPREDNISolone (MEDROL DOSEPACK) 4 MG tablet; Take by mouth. -     benzonatate (TESSALON) 100 MG capsule; Take 1 capsule by mouth 3 times daily as needed for Cough    Cough, unspecified type  -     benzonatate (TESSALON) 100 MG capsule; Take 1 capsule by mouth 3 times daily as needed for Cough    Post covid-19 condition, unspecified      Disposition:  Disposition: Discharge to home. Vital signs, past medical history, medications, and allergies reviewed at visit. Increase fluids and rest. Additional symptomatic relief discussed. Script written for cefdinir, Dosepak, Tessalon Perles, side effects discussed. I advised if no improvement I would like her to return and we will do chest x-ray. However, symptoms have been intermittent and vital stable at this time. Follow up PCP no improvement after 7-10 days of symptoms. ED sooner if symptoms worsen or change. Red flag symptoms discussed. Pt is in agreement with this care plan. All questions answered.     [employer],

## 2024-01-22 ENCOUNTER — HOSPITAL ENCOUNTER (OUTPATIENT)
Dept: PULMONOLOGY | Age: 59
Discharge: HOME | End: 2024-01-22
Payer: COMMERCIAL

## 2024-01-22 DIAGNOSIS — R13.10 DYSPHAGIA, UNSPECIFIED TYPE: ICD-10-CM

## 2024-01-22 DIAGNOSIS — M34.1 CRST SYNDROME (HCC): ICD-10-CM

## 2024-01-22 PROCEDURE — 94729 DIFFUSING CAPACITY: CPT

## 2024-01-22 PROCEDURE — 94726 PLETHYSMOGRAPHY LUNG VOLUMES: CPT

## 2024-01-22 PROCEDURE — 94060 EVALUATION OF WHEEZING: CPT

## 2024-11-18 ENCOUNTER — OFFICE VISIT (OUTPATIENT)
Dept: FAMILY MEDICINE CLINIC | Age: 59
End: 2024-11-18
Payer: COMMERCIAL

## 2024-11-18 VITALS
OXYGEN SATURATION: 96 % | RESPIRATION RATE: 18 BRPM | WEIGHT: 293 LBS | HEIGHT: 71 IN | DIASTOLIC BLOOD PRESSURE: 80 MMHG | HEART RATE: 89 BPM | SYSTOLIC BLOOD PRESSURE: 130 MMHG | TEMPERATURE: 97.3 F | BODY MASS INDEX: 41.02 KG/M2

## 2024-11-18 DIAGNOSIS — L60.0 INGROWN NAIL OF GREAT TOE: ICD-10-CM

## 2024-11-18 DIAGNOSIS — L03.031 PARONYCHIA OF TOE OF RIGHT FOOT: Primary | ICD-10-CM

## 2024-11-18 PROCEDURE — 99214 OFFICE O/P EST MOD 30 MIN: CPT | Performed by: NURSE PRACTITIONER

## 2024-11-18 PROCEDURE — 3075F SYST BP GE 130 - 139MM HG: CPT | Performed by: NURSE PRACTITIONER

## 2024-11-18 PROCEDURE — 3079F DIAST BP 80-89 MM HG: CPT | Performed by: NURSE PRACTITIONER

## 2024-11-18 RX ORDER — MUPIROCIN 20 MG/G
OINTMENT TOPICAL
Qty: 1 EACH | Refills: 0 | Status: SHIPPED | OUTPATIENT
Start: 2024-11-18

## 2024-11-18 RX ORDER — MELOXICAM 15 MG/1
TABLET ORAL
COMMUNITY
Start: 2024-11-13

## 2024-11-18 RX ORDER — DOXYCYCLINE 100 MG/1
100 CAPSULE ORAL 2 TIMES DAILY
Qty: 20 CAPSULE | Refills: 0 | Status: SHIPPED | OUTPATIENT
Start: 2024-11-18 | End: 2024-11-28

## 2024-11-18 NOTE — PROGRESS NOTES
0    Allergies:     Allergies   Allergen Reactions    Lisinopril        Social History:     Social History     Tobacco Use    Smoking status: Never     Passive exposure: Current    Smokeless tobacco: Never   Substance Use Topics    Alcohol use: No    Drug use: No       Physical Exam:     Vitals:    11/18/24 0918   BP: 130/80   Pulse: 89   Resp: 18   Temp: 97.3 °F (36.3 °C)   TempSrc: Temporal   SpO2: 96%   Weight: (!) 154 kg (339 lb 6.4 oz)   Height: 1.803 m (5' 11\")       Physical Exam (PE)   Constitutional: Alert, development consistent with age.  HENT:      Head: Normocephalic.      Right Ear: External ear normal.      Left Ear: External ear normal.      Nose: Normal.      Mouth/Throat:     Mouth: Mucous membranes are moist.      Pharynx: Oropharynx is clear.  Eyes: Pupils: Pupils are equal, round, and reactive to light.   Neck: Normal ROM. Supple.  Cardiovascular: Heart RRR without pathologic murmurs or gallops.   Pulmonary: Respiratory effort normal.  Normal breath sounds.  Abdomen: Soft, nontender, normal bowel sounds.  Skin: Normal turgor and appropriately dry to touch. Right great toe: nailfold with erythema, warmth, and tenderness. Positive ingrown toenail. No bleeding or drainage. No lymphangitic streaking. No fluctuance noted.  Musculoskeletal: General: Normal strength / ROM.  Neurological:  Orientation age-appropriate unless noted elseware.  Motor functions intact.  Psychiatric: Mood and Affect: Mood normal. Behavior: Behavior normal.    Testing:   (All laboratory and radiology results have been personally reviewed by myself)  Labs:  No results found for this visit on 11/18/24.    Imaging:  All Radiology results interpreted by Radiologist unless otherwise noted.  No orders to display       Assessment / Plan:   The patient's vitals, allergies, medications, and past medical history have been reviewed.  Tina was seen today for other.    Diagnoses and all orders for this visit:    Paronychia of toe of right

## 2024-12-17 ENCOUNTER — OFFICE VISIT (OUTPATIENT)
Dept: FAMILY MEDICINE CLINIC | Age: 59
End: 2024-12-17
Payer: COMMERCIAL

## 2024-12-17 VITALS
TEMPERATURE: 97.7 F | SYSTOLIC BLOOD PRESSURE: 120 MMHG | RESPIRATION RATE: 16 BRPM | HEART RATE: 87 BPM | WEIGHT: 293 LBS | DIASTOLIC BLOOD PRESSURE: 70 MMHG | BODY MASS INDEX: 47.14 KG/M2 | OXYGEN SATURATION: 98 %

## 2024-12-17 DIAGNOSIS — R39.9 UTI SYMPTOMS: ICD-10-CM

## 2024-12-17 DIAGNOSIS — N39.0 URINARY TRACT INFECTION WITH HEMATURIA, SITE UNSPECIFIED: Primary | ICD-10-CM

## 2024-12-17 DIAGNOSIS — R31.9 URINARY TRACT INFECTION WITH HEMATURIA, SITE UNSPECIFIED: Primary | ICD-10-CM

## 2024-12-17 LAB
BILIRUBIN, POC: NEGATIVE
BLOOD URINE, POC: ABNORMAL
CLARITY, POC: CLEAR
COLOR, POC: YELLOW
GLUCOSE URINE, POC: NEGATIVE MG/DL
KETONES, POC: NEGATIVE MG/DL
LEUKOCYTE EST, POC: ABNORMAL
NITRITE, POC: NEGATIVE
PH, POC: 5.5
PROTEIN, POC: 30 MG/DL
SPECIFIC GRAVITY, POC: 1.02
UROBILINOGEN, POC: 0.2 MG/DL

## 2024-12-17 PROCEDURE — 3078F DIAST BP <80 MM HG: CPT | Performed by: NURSE PRACTITIONER

## 2024-12-17 PROCEDURE — 99213 OFFICE O/P EST LOW 20 MIN: CPT | Performed by: NURSE PRACTITIONER

## 2024-12-17 PROCEDURE — 81002 URINALYSIS NONAUTO W/O SCOPE: CPT | Performed by: NURSE PRACTITIONER

## 2024-12-17 PROCEDURE — 3074F SYST BP LT 130 MM HG: CPT | Performed by: NURSE PRACTITIONER

## 2024-12-17 RX ORDER — SULFAMETHOXAZOLE AND TRIMETHOPRIM 800; 160 MG/1; MG/1
1 TABLET ORAL 2 TIMES DAILY
Qty: 14 TABLET | Refills: 0 | Status: SHIPPED | OUTPATIENT
Start: 2024-12-17 | End: 2024-12-24

## 2024-12-17 RX ORDER — PHENAZOPYRIDINE HYDROCHLORIDE 100 MG/1
100 TABLET, FILM COATED ORAL 3 TIMES DAILY PRN
Qty: 6 TABLET | Refills: 0 | Status: SHIPPED | OUTPATIENT
Start: 2024-12-17 | End: 2024-12-19

## 2024-12-17 SDOH — ECONOMIC STABILITY: INCOME INSECURITY: HOW HARD IS IT FOR YOU TO PAY FOR THE VERY BASICS LIKE FOOD, HOUSING, MEDICAL CARE, AND HEATING?: NOT HARD AT ALL

## 2024-12-17 SDOH — ECONOMIC STABILITY: FOOD INSECURITY: WITHIN THE PAST 12 MONTHS, THE FOOD YOU BOUGHT JUST DIDN'T LAST AND YOU DIDN'T HAVE MONEY TO GET MORE.: NEVER TRUE

## 2024-12-17 SDOH — ECONOMIC STABILITY: FOOD INSECURITY: WITHIN THE PAST 12 MONTHS, YOU WORRIED THAT YOUR FOOD WOULD RUN OUT BEFORE YOU GOT MONEY TO BUY MORE.: NEVER TRUE

## 2024-12-17 ASSESSMENT — PATIENT HEALTH QUESTIONNAIRE - PHQ9
1. LITTLE INTEREST OR PLEASURE IN DOING THINGS: NOT AT ALL
SUM OF ALL RESPONSES TO PHQ9 QUESTIONS 1 & 2: 0
SUM OF ALL RESPONSES TO PHQ QUESTIONS 1-9: 0
2. FEELING DOWN, DEPRESSED OR HOPELESS: NOT AT ALL
SUM OF ALL RESPONSES TO PHQ QUESTIONS 1-9: 0

## 2024-12-17 NOTE — PROGRESS NOTES
24  Tina Mota : 1965 Sex: female  Age 59 y.o.    Subjective:  Chief Complaint   Patient presents with    Other     Kidney infection pressure , low back pain       HPI:   Tina Mota , 59 y.o. female presents to the clinic for evaluation of UTI symptoms x 2 days. Reports associated lower back discomfort, dysuria, frequency, urgency, and suprapubic pressure. The patient has not taken any treatment for symptoms. The patient reports unchanged symptoms over time. Denies hematuria, flank pain, vaginal discharge, vaginal bleeding, lethargy. The patient also denies headache, fever, chest pain, abdominal pain, shortness of breath, and nausea / vomiting / diarrhea. Patient's last menstrual period was 2015 (approximate).    ROS:   Unless otherwise stated in this report the patient's positive and negative responses for review of systems for constitutional, eyes, ENT, cardiovascular, respiratory, gastrointestinal, neurological, , musculoskeletal, and integument systems and related systems to the presenting problem are either stated in the history of present illness or were not pertinent or were negative for the symptoms and/or complaints related to the presenting medical problem.  Positives and pertinent negatives as per HPI.  All others reviewed and are negative.      PMH:     Past Medical History:   Diagnosis Date    Asymptomatic varicose veins     Breast self examination education, encounter for     Chronic cystitis     found on cystoscopy    Colon polyps     Depression screening     Diabetes mellitus screening 14    HGB A1c    Diuretic-induced hypokalemia 2016    GERD (gastroesophageal reflux disease)     Hematuria     evaluated  by Dr. Montgomery.  Cystoscopy revealed chronic cystitis.    Hx of mammogram     13    Hyperlipidemia 14    Hypertension     Immunizations up to date 14    INFLUENZA    Laboratory test 07/15/15    T3 FREE, TSH, T4 FREE    Migraines

## 2024-12-18 LAB
CULTURE: NORMAL
SPECIMEN DESCRIPTION: NORMAL

## 2024-12-31 ENCOUNTER — OFFICE VISIT (OUTPATIENT)
Dept: PODIATRY | Age: 59
End: 2024-12-31
Payer: COMMERCIAL

## 2024-12-31 VITALS
HEART RATE: 89 BPM | OXYGEN SATURATION: 93 % | BODY MASS INDEX: 41.02 KG/M2 | HEIGHT: 71 IN | TEMPERATURE: 98 F | WEIGHT: 293 LBS

## 2024-12-31 DIAGNOSIS — L60.0 OC (ONYCHOCRYPTOSIS): Primary | ICD-10-CM

## 2024-12-31 DIAGNOSIS — M79.674 PAIN OF TOE OF RIGHT FOOT: ICD-10-CM

## 2024-12-31 PROCEDURE — 11750 EXCISION NAIL&NAIL MATRIX: CPT | Performed by: PODIATRIST

## 2024-12-31 PROCEDURE — 99203 OFFICE O/P NEW LOW 30 MIN: CPT | Performed by: PODIATRIST

## 2024-12-31 NOTE — PROGRESS NOTES
24     Tina Mota    : 1965 Sex: female   Age: 59 y.o.    Patient was referred by: Rafita Angela Jr, APRN-CNP  Patient's PCP/Provider is:  Kemal Chavez DO    Subjective:    Patient seen today for evaluation chronic ingrown nail right great toe    Chief Complaint   Patient presents with    Nail Problem     RIGHT GREAT TOE       HPI: Patient has been on oral antibiotics and applying topical medication to the area over the last several weeks.  Patient is in no acute distress.  She denies any nausea, vomiting, fever, chills.  Patient presents today to discuss more permanent treatment options available at this time due to chronicity of symptoms.  No other additional abnormalities noted.    ROS:  Const: Positives and pertinent negatives as per HPI.     Musculo: Denies symptoms other than stated above.  Neuro: Denies symptoms other than stated above.  Skin: Denies symptoms other than stated above.    Current Medications:    Current Outpatient Medications:     meloxicam (MOBIC) 15 MG tablet, , Disp: , Rfl:     mupirocin (BACTROBAN) 2 % ointment, Apply topically 2 times daily., Disp: 1 each, Rfl: 0    losartan-hydroCHLOROthiazide (HYZAAR) 50-12.5 MG per tablet, Take 1 tablet by mouth daily, Disp: , Rfl:     atorvastatin (LIPITOR) 20 MG tablet, take 1 tablet by mouth once daily, Disp: , Rfl: 0    aspirin 81 MG tablet, Take 1 tablet by mouth daily, Disp: , Rfl:     Cholecalciferol (VITAMIN D3) 2000 UNITS CAPS, Take 1 capsule by mouth daily, Disp: , Rfl:     losartan (COZAAR) 50 MG tablet, take 1 tablet by mouth once daily, Disp: , Rfl:     amLODIPine (NORVASC) 5 MG tablet, take 1 tablet by mouth once daily as directed, Disp: , Rfl: 0    Allergies:  Allergies   Allergen Reactions    Lisinopril        Vitals:    24 0844   Pulse: 89   Temp: 98 °F (36.7 °C)   SpO2: 93%   Weight: (!) 149.7 kg (330 lb)   Height: 1.803 m (5' 11\")        Past Medical History:   Diagnosis Date    Asymptomatic varicose

## 2024-12-31 NOTE — PROGRESS NOTES
Patient is in today for evaluation of right great toenail. Patient says she had an infection in the toenail but thinks it may still be ingrown. Pcp is Kemal Chavez DO  Last ov 1/4/24

## 2025-01-07 ENCOUNTER — OFFICE VISIT (OUTPATIENT)
Dept: PODIATRY | Age: 60
End: 2025-01-07

## 2025-01-07 VITALS — HEIGHT: 71 IN | BODY MASS INDEX: 41.02 KG/M2 | TEMPERATURE: 97.5 F | WEIGHT: 293 LBS

## 2025-01-07 DIAGNOSIS — L60.0 OC (ONYCHOCRYPTOSIS): Primary | ICD-10-CM

## 2025-01-07 PROCEDURE — 99024 POSTOP FOLLOW-UP VISIT: CPT | Performed by: PODIATRIST

## 2025-01-07 NOTE — PROGRESS NOTES
Patient here for post-op right great toenail matrixectomy.  Patient states it is still a little tender but much improved.   Kemal Chavez,  last visit 1/4/2024   Electronically signed by Mouna Dc LPN on 1/7/2025 at 9:29 AM

## 2025-01-07 NOTE — PROGRESS NOTES
25     Tina Mota    : 1965   Sex: female    Age: 59 y.o.    Patient's PCP/Provider is:  Kemal Chavez DO    Subjective:  Patient is seen today for follow-up regarding partial matrixectomy right great toe lateral border.  Overall patient is doing great at this time with minimal issues noted.  She denies any nausea, vomiting, fever, chills.  Patient is pleased with procedure performed and outcome.  No other additional abnormalities noted.    Chief Complaint   Patient presents with    Post-Op Check     Post op matrix right great toe       ROS:  Const: Positives and pertinent negatives as per HPI.    Musculo: Denies symptoms other than stated above.  Neuro: Denies symptoms other than stated above.  Skin: Denies symptoms other than stated above.    Current Medications:    Current Outpatient Medications:     meloxicam (MOBIC) 15 MG tablet, , Disp: , Rfl:     mupirocin (BACTROBAN) 2 % ointment, Apply topically 2 times daily., Disp: 1 each, Rfl: 0    losartan-hydroCHLOROthiazide (HYZAAR) 50-12.5 MG per tablet, Take 1 tablet by mouth daily, Disp: , Rfl:     atorvastatin (LIPITOR) 20 MG tablet, take 1 tablet by mouth once daily, Disp: , Rfl: 0    aspirin 81 MG tablet, Take 1 tablet by mouth daily, Disp: , Rfl:     Cholecalciferol (VITAMIN D3) 2000 UNITS CAPS, Take 1 capsule by mouth daily, Disp: , Rfl:     losartan (COZAAR) 50 MG tablet, take 1 tablet by mouth once daily, Disp: , Rfl:     amLODIPine (NORVASC) 5 MG tablet, take 1 tablet by mouth once daily as directed, Disp: , Rfl: 0    Allergies:  Allergies   Allergen Reactions    Lisinopril        Vitals:    25 0926   Temp: 97.5 °F (36.4 °C)   TempSrc: Temporal   Weight: (!) 149.7 kg (330 lb)   Height: 1.803 m (5' 11\")       Exam:  Neurovascular status unchanged.  Matrix site healed lateral border right great toe.  No edema, erythema, calor, or any signs of infection noted right great toe.  Adequate range of motion noted right great toe.  Stable